# Patient Record
(demographics unavailable — no encounter records)

---

## 2024-11-20 NOTE — ASSESSMENT
[FreeTextEntry1] : EVALUATION AND MANAGEMENT   1- IMPINGEMENT SYNDROME  - NARROW SS OUTLET ON XRAY CONSISTENT WITH IMPINGEMENT SYNDROME  PLAN -  HOME EXERCISES DEMONSTRATED AND PROVIDED,  PROGRESS TO P.T. 2 X 4 WEEKS FOR SCAPULAR POSTURE, FLEXIBILITY AND REHAB   2 - ROTATOR CUFF TENDONITIS - TENDONITIS, BURSITIS, NO COMPLETE TEAR SEEN ON ULTRASOUND EVALUATION  PLAN - KENALOG INJECTION  ( SEE PROCEDURE NOTE )

## 2024-11-20 NOTE — PROCEDURE
[de-identified] : INJECTION LEFT SHOULDER SA SPACE  Patient has demonstrated limited relief from NSAIDS, rest, exercises / PT, and after discussion of the risks and benefits, the patient has elected to proceed with an ULTRASOUND GUIDED injection into the LEFT SUBACROMIAL  SPACE LATERAL APPROACH    Confirmed that the patient does not have history of prior adverse reactions, active, infections, or relevant allergies. There was no effusion, erythema, or warmth, and the skin was clear  The skin was sterilized with alcohol. Ethyl Chloride was used as a topical anesthetic. Routine sterile technique.  The site was injected UTILIZING ULTRASOUND GUIDANCE to confirm appropriate placement of the needle- with a mixture of medication and local anesthetic. The injection was completed without complication and a bandage was applied.   The patient tolerated the procedure well and was given post-injection instructions.Rec: Cold therapy, analgesics, avoid heavy activity. MEDICATION: 4cc of 1% xylocaine + 10mg of Kenalog LOT# [id number] EXP MARCH 2026

## 2024-11-20 NOTE — DISCUSSION/SUMMARY
[de-identified] : ULTRASOUND SHOULDER PERFORMED , EVALUATED, DOCUMENTED - AND REVIEWED WITH PATIENT EVALUATION  REVEALS INFLAMMATORY CHANGES WITHOUT SIGNIFICANT TENDON TEAR  PATIENT HAS ELECTED TO PROCEED WITH KENALOG INJECTION SHOULDER RISKS AND BENEFITS DISCUSSED - VERBAL CONSENT OBTAINED SEE PROCEDURE NOTE     POST INJECTION INSTRUCTIONS:   INJECTION THERAPY HANDOUT PROVIDED   COLD THERAPY , ANALGESICS PRN   HOME  EXERCISES QD -  MOON SHOULDER GROUP   START P.T.  WITHIN 2 WEEKS AFTER INJECTION - 2 X 4 WEEKS - PROGRESS TO HOME EXERCISES   CONSIDER REPEAT INJECTION AFTER P.T.    MRI IF NO RELIEF 12 WEEKS

## 2024-11-20 NOTE — PHYSICAL EXAM
[de-identified] : PHYSICAL EXAM LEFT  SHOULDER  NECK EXAM  FROM NONTENDER  SPURLING  RIGHT=NEG, LEFT=NEG  NORMAL POSTURE / SCAPULAR PROTRACTION AROM  LEFT  140 / 140 / 80 / 20 RIGHT  150 / 150 / 100 / 40 TENDER: SA REGION LATERAL  SPECIAL TESTING : LOU - POSITIVE  MINDI - POSITIVE  SPEED TEST - POSITIVE  GATES - NEGATIVE  APPREHENSION AND SUPPRESSION - NEGATIVE   RC STRENGTH TESTING  SS:  5/5 SUB 5/5 IS     5/5 BICEPS  5/5  SENSATION  - GROSSLY INTACT    [de-identified] : I ORDERED XRAYS OF SHOULDER TO EVALUATE PAINFUL SYMPTOMS  LEFT SHOULDER XRAY (2 VIEWS - AP AND OUTLET) -  NO OBVIOUS FRACTURE , SEPARATION OR DISLOCATION GRADE 1  OSTEOARTHRITIS, TYPE 3B ACROMION ANTERIO SPUR  CSA=32.8.  DIAGNOSTIC ULTRASOUND LEFT SHOULDER  DIAGNOSTIC SONOGRAPHY of the Rotator Cuff Soft Tissue of the LEFT  SHOULDER was performed in Multiple Scan Planes with varying transducer frequencies. Imaging of the Supraspinatus Tendon reveals TENDONITIS, BURSITIS, ARTICULAR SIDE DEGENERATION  WITH OUT SIGNIFICANT / COMPLETE TEAR Imaging of the Biceps Tendon reveals no significant tear. Imaging of the Subscapularis Tendon reveals no significant tear. Imaging of the Infraspinatus Tendon reveals no significant tear. Key images were save digitally and reviewed with patient.

## 2024-11-20 NOTE — HISTORY OF PRESENT ILLNESS
[de-identified] : PATIENT COMPLAINS OF LEFT SHOULDER PAIN    WHAT IS YOUR DOMINANT HAND - RHD PAIN BEGAN EARLY JUNE 2024 RELATED INJURY / ACCIDENT/NO SPECIFIC INJURY - NO LOCALIZED PAIN  IS YOUR PROBLEM GETTING WORSE - SAME IS THIS WORKERS COMPENSATION INJURY/ NO FAULT: NO PAIN IS INTERMITTENT PAIN   9/10 DESCRIPTION OF PAIN: SHARP, DULL WORSE AT PARTICULAR TIME OF DAY: WORSE AT NIGHT PRIOR/CURRENT TREATMENTS: IBUPROFEN HELPS A LITTLE AGGRAVATING FACTORS:  OVER HEADLIFTING, REACHING BEHIND ANY SYMPTOMS: NONE ASSOCIATED NUMBNESS / TINGLING - IN THE LEFT HAND, CLICKING  PREVIOUS DISLOCATION- NO HAS HAD PHYSICAL THERAPY WITHOUT RELIEF- YES - NOT HELPFUL HAS HAD PREVIOUS SURGERY- YES  HAS HAD PREVIOUS INJECTION - YES - IN THE FOOT - NOT HELPFUL HAS HAD PREVIOUS IMAGING - NO